# Patient Record
Sex: FEMALE | Race: WHITE | NOT HISPANIC OR LATINO | URBAN - METROPOLITAN AREA
[De-identification: names, ages, dates, MRNs, and addresses within clinical notes are randomized per-mention and may not be internally consistent; named-entity substitution may affect disease eponyms.]

---

## 2018-08-28 ENCOUNTER — OFFICE (OUTPATIENT)
Dept: URBAN - METROPOLITAN AREA PATHOLOGY 22 | Facility: PATHOLOGY | Age: 73
End: 2018-08-28
Payer: OTHER GOVERNMENT

## 2018-08-28 ENCOUNTER — AMBULATORY SURGICAL CENTER (OUTPATIENT)
Dept: URBAN - METROPOLITAN AREA SURGERY 2 | Facility: SURGERY | Age: 73
End: 2018-08-28
Payer: OTHER GOVERNMENT

## 2018-08-28 VITALS
SYSTOLIC BLOOD PRESSURE: 125 MMHG | DIASTOLIC BLOOD PRESSURE: 61 MMHG | WEIGHT: 170 LBS | DIASTOLIC BLOOD PRESSURE: 66 MMHG | DIASTOLIC BLOOD PRESSURE: 61 MMHG | HEART RATE: 75 BPM | HEART RATE: 77 BPM | OXYGEN SATURATION: 98 % | TEMPERATURE: 97.7 F | TEMPERATURE: 98.5 F | RESPIRATION RATE: 18 BRPM | HEART RATE: 81 BPM | SYSTOLIC BLOOD PRESSURE: 109 MMHG | WEIGHT: 170 LBS | RESPIRATION RATE: 18 BRPM | DIASTOLIC BLOOD PRESSURE: 75 MMHG | DIASTOLIC BLOOD PRESSURE: 75 MMHG | DIASTOLIC BLOOD PRESSURE: 66 MMHG | HEIGHT: 62 IN | HEART RATE: 75 BPM | OXYGEN SATURATION: 98 % | SYSTOLIC BLOOD PRESSURE: 109 MMHG | TEMPERATURE: 98.5 F | HEIGHT: 62 IN | SYSTOLIC BLOOD PRESSURE: 110 MMHG | SYSTOLIC BLOOD PRESSURE: 110 MMHG | HEART RATE: 77 BPM | OXYGEN SATURATION: 99 % | SYSTOLIC BLOOD PRESSURE: 101 MMHG | OXYGEN SATURATION: 97 % | OXYGEN SATURATION: 97 % | SYSTOLIC BLOOD PRESSURE: 125 MMHG | SYSTOLIC BLOOD PRESSURE: 101 MMHG | HEART RATE: 81 BPM | DIASTOLIC BLOOD PRESSURE: 77 MMHG | DIASTOLIC BLOOD PRESSURE: 77 MMHG | OXYGEN SATURATION: 99 % | TEMPERATURE: 97.7 F

## 2018-08-28 DIAGNOSIS — D12.3 BENIGN NEOPLASM OF TRANSVERSE COLON: ICD-10-CM

## 2018-08-28 DIAGNOSIS — K64.9 UNSPECIFIED HEMORRHOIDS: ICD-10-CM

## 2018-08-28 DIAGNOSIS — D17.79 BENIGN LIPOMATOUS NEOPLASM OF OTHER SITES: ICD-10-CM

## 2018-08-28 DIAGNOSIS — Z86.010 PERSONAL HISTORY OF COLONIC POLYPS: ICD-10-CM

## 2018-08-28 DIAGNOSIS — K57.30 DIVERTICULOSIS OF LARGE INTESTINE WITHOUT PERFORATION OR ABS: ICD-10-CM

## 2018-08-28 PROCEDURE — 45380 COLONOSCOPY AND BIOPSY: CPT | Mod: PT | Performed by: INTERNAL MEDICINE

## 2018-08-28 PROCEDURE — G8907 PT DOC NO EVENTS ON DISCHARG: HCPCS | Performed by: INTERNAL MEDICINE

## 2018-08-28 PROCEDURE — 88305 TISSUE EXAM BY PATHOLOGIST: CPT | Performed by: INTERNAL MEDICINE

## 2018-08-28 PROCEDURE — G8918 PT W/O PREOP ORDER IV AB PRO: HCPCS | Performed by: INTERNAL MEDICINE

## 2018-11-05 ENCOUNTER — OFFICE (OUTPATIENT)
Dept: URBAN - METROPOLITAN AREA CLINIC 19 | Facility: CLINIC | Age: 73
End: 2018-11-05
Payer: OTHER GOVERNMENT

## 2018-11-05 VITALS
SYSTOLIC BLOOD PRESSURE: 118 MMHG | WEIGHT: 168 LBS | DIASTOLIC BLOOD PRESSURE: 76 MMHG | HEIGHT: 62 IN | HEART RATE: 109 BPM

## 2018-11-05 DIAGNOSIS — M13.859 OTHER SPECIFIED ARTHRITIS, UNSPECIFIED HIP: ICD-10-CM

## 2018-11-05 DIAGNOSIS — K44.9 DIAPHRAGMATIC HERNIA WITHOUT OBSTRUCTION OR GANGRENE: ICD-10-CM

## 2018-11-05 DIAGNOSIS — R49.0 DYSPHONIA: ICD-10-CM

## 2018-11-05 DIAGNOSIS — K21.9 GASTRO-ESOPHAGEAL REFLUX DISEASE WITHOUT ESOPHAGITIS: ICD-10-CM

## 2018-11-05 DIAGNOSIS — K22.70 BARRETT'S ESOPHAGUS WITHOUT DYSPLASIA: ICD-10-CM

## 2018-11-05 PROCEDURE — 99214 OFFICE O/P EST MOD 30 MIN: CPT | Performed by: INTERNAL MEDICINE

## 2018-11-05 NOTE — SERVICEHPINOTES
Mrs. Jasmine is a 73 year old female originally seen for surveillance of colonic neoplasia. The patient denies any fevers, weight loss, abdominal pain, nausea, vomiting, constipation, diarrhea, or rectal bleeding. Last colonoscopy was in 2013 was okay. 2004 with 10mm adenoma removed.She is still hoarse. Saw ENT and had Laryngoscopy. Was started on reglan 10mg at bedtime. Has not provided much relief.  Also on esomeprazole BID. She has a known large hiatal hernia. She is due for barretts surveillance

## 2019-02-05 ENCOUNTER — OFFICE (OUTPATIENT)
Dept: URBAN - METROPOLITAN AREA CLINIC 19 | Facility: CLINIC | Age: 74
End: 2019-02-05
Payer: OTHER GOVERNMENT

## 2019-02-05 VITALS
DIASTOLIC BLOOD PRESSURE: 76 MMHG | HEART RATE: 101 BPM | SYSTOLIC BLOOD PRESSURE: 113 MMHG | HEIGHT: 62 IN | WEIGHT: 162 LBS

## 2019-02-05 DIAGNOSIS — R49.0 DYSPHONIA: ICD-10-CM

## 2019-02-05 DIAGNOSIS — M13.859 OTHER SPECIFIED ARTHRITIS, UNSPECIFIED HIP: ICD-10-CM

## 2019-02-05 DIAGNOSIS — K22.70 BARRETT'S ESOPHAGUS WITHOUT DYSPLASIA: ICD-10-CM

## 2019-02-05 DIAGNOSIS — K21.9 GASTRO-ESOPHAGEAL REFLUX DISEASE WITHOUT ESOPHAGITIS: ICD-10-CM

## 2019-02-05 PROCEDURE — 99213 OFFICE O/P EST LOW 20 MIN: CPT | Performed by: INTERNAL MEDICINE

## 2019-02-05 RX ORDER — DEXLANSOPRAZOLE 60 MG/1
60 CAPSULE, DELAYED RELEASE ORAL
Qty: 30 | Refills: 3 | Status: COMPLETED
Start: 2019-02-05 | End: 2019-05-08

## 2019-05-08 ENCOUNTER — OFFICE (OUTPATIENT)
Dept: URBAN - METROPOLITAN AREA PATHOLOGY 22 | Facility: PATHOLOGY | Age: 74
End: 2019-05-08
Payer: OTHER GOVERNMENT

## 2019-05-08 ENCOUNTER — AMBULATORY SURGICAL CENTER (OUTPATIENT)
Dept: URBAN - METROPOLITAN AREA SURGERY 2 | Facility: SURGERY | Age: 74
End: 2019-05-08
Payer: OTHER GOVERNMENT

## 2019-05-08 VITALS
WEIGHT: 170 LBS | DIASTOLIC BLOOD PRESSURE: 45 MMHG | RESPIRATION RATE: 16 BRPM | HEART RATE: 71 BPM | HEART RATE: 72 BPM | OXYGEN SATURATION: 97 % | HEART RATE: 81 BPM | TEMPERATURE: 97.6 F | OXYGEN SATURATION: 98 % | RESPIRATION RATE: 16 BRPM | SYSTOLIC BLOOD PRESSURE: 89 MMHG | RESPIRATION RATE: 17 BRPM | DIASTOLIC BLOOD PRESSURE: 59 MMHG | TEMPERATURE: 97.6 F | OXYGEN SATURATION: 97 % | TEMPERATURE: 98.1 F | HEART RATE: 72 BPM | OXYGEN SATURATION: 98 % | DIASTOLIC BLOOD PRESSURE: 52 MMHG | HEIGHT: 62 IN | WEIGHT: 170 LBS | SYSTOLIC BLOOD PRESSURE: 80 MMHG | OXYGEN SATURATION: 96 % | HEART RATE: 70 BPM | SYSTOLIC BLOOD PRESSURE: 85 MMHG | SYSTOLIC BLOOD PRESSURE: 80 MMHG | SYSTOLIC BLOOD PRESSURE: 101 MMHG | OXYGEN SATURATION: 94 % | RESPIRATION RATE: 18 BRPM | HEART RATE: 82 BPM | HEART RATE: 82 BPM | OXYGEN SATURATION: 96 % | DIASTOLIC BLOOD PRESSURE: 63 MMHG | RESPIRATION RATE: 18 BRPM | HEART RATE: 81 BPM | OXYGEN SATURATION: 94 % | OXYGEN SATURATION: 92 % | OXYGEN SATURATION: 92 % | DIASTOLIC BLOOD PRESSURE: 59 MMHG | SYSTOLIC BLOOD PRESSURE: 94 MMHG | TEMPERATURE: 98.1 F | DIASTOLIC BLOOD PRESSURE: 45 MMHG | HEART RATE: 67 BPM | SYSTOLIC BLOOD PRESSURE: 101 MMHG | HEART RATE: 71 BPM | HEIGHT: 62 IN | HEART RATE: 70 BPM | SYSTOLIC BLOOD PRESSURE: 94 MMHG | DIASTOLIC BLOOD PRESSURE: 63 MMHG | RESPIRATION RATE: 17 BRPM | DIASTOLIC BLOOD PRESSURE: 52 MMHG | SYSTOLIC BLOOD PRESSURE: 89 MMHG | SYSTOLIC BLOOD PRESSURE: 85 MMHG | HEART RATE: 67 BPM

## 2019-05-08 DIAGNOSIS — K22.70 BARRETT'S ESOPHAGUS WITHOUT DYSPLASIA: ICD-10-CM

## 2019-05-08 DIAGNOSIS — K31.7 POLYP OF STOMACH AND DUODENUM: ICD-10-CM

## 2019-05-08 DIAGNOSIS — K44.9 DIAPHRAGMATIC HERNIA WITHOUT OBSTRUCTION OR GANGRENE: ICD-10-CM

## 2019-05-08 DIAGNOSIS — K20.9 ESOPHAGITIS, UNSPECIFIED: ICD-10-CM

## 2019-05-08 PROCEDURE — 43239 EGD BIOPSY SINGLE/MULTIPLE: CPT | Performed by: INTERNAL MEDICINE

## 2019-05-08 PROCEDURE — G8907 PT DOC NO EVENTS ON DISCHARG: HCPCS | Performed by: INTERNAL MEDICINE

## 2019-05-08 PROCEDURE — 88305 TISSUE EXAM BY PATHOLOGIST: CPT | Performed by: INTERNAL MEDICINE

## 2019-05-08 PROCEDURE — 3126F ESOPH BX RPRT W/DYSPL INFO: CPT | Performed by: INTERNAL MEDICINE

## 2019-05-08 PROCEDURE — G8918 PT W/O PREOP ORDER IV AB PRO: HCPCS | Performed by: INTERNAL MEDICINE

## 2020-03-02 ENCOUNTER — OFFICE (OUTPATIENT)
Dept: URBAN - METROPOLITAN AREA CLINIC 19 | Facility: CLINIC | Age: 75
End: 2020-03-02
Payer: OTHER GOVERNMENT

## 2020-03-02 VITALS
SYSTOLIC BLOOD PRESSURE: 87 MMHG | WEIGHT: 161 LBS | HEIGHT: 62 IN | HEART RATE: 85 BPM | DIASTOLIC BLOOD PRESSURE: 54 MMHG

## 2020-03-02 DIAGNOSIS — R07.2 PRECORDIAL PAIN: ICD-10-CM

## 2020-03-02 DIAGNOSIS — K22.719 BARRETT'S ESOPHAGUS WITH DYSPLASIA, UNSPECIFIED: ICD-10-CM

## 2020-03-02 DIAGNOSIS — R07.89 OTHER CHEST PAIN: ICD-10-CM

## 2020-03-02 PROCEDURE — 99214 OFFICE O/P EST MOD 30 MIN: CPT | Performed by: INTERNAL MEDICINE

## 2020-03-02 RX ORDER — METHYLPREDNISOLONE 4 MG/1
TABLET ORAL
Qty: 1 | Refills: 0 | Status: COMPLETED
Start: 2020-03-02 | End: 2020-03-25

## 2020-03-18 ENCOUNTER — OFFICE (OUTPATIENT)
Dept: URBAN - METROPOLITAN AREA CLINIC 19 | Facility: CLINIC | Age: 75
End: 2020-03-18
Payer: OTHER GOVERNMENT

## 2020-03-18 DIAGNOSIS — K82.4 CHOLESTEROLOSIS OF GALLBLADDER: ICD-10-CM

## 2020-03-18 DIAGNOSIS — R07.9 CHEST PAIN, UNSPECIFIED: ICD-10-CM

## 2020-03-18 DIAGNOSIS — K76.0 FATTY (CHANGE OF) LIVER, NOT ELSEWHERE CLASSIFIED: ICD-10-CM

## 2020-03-18 PROCEDURE — 76705 ECHO EXAM OF ABDOMEN: CPT | Mod: TC | Performed by: INTERNAL MEDICINE

## 2020-03-25 ENCOUNTER — AMBULATORY SURGICAL CENTER (OUTPATIENT)
Dept: URBAN - METROPOLITAN AREA SURGERY 2 | Facility: SURGERY | Age: 75
End: 2020-03-25
Payer: OTHER GOVERNMENT

## 2020-03-25 ENCOUNTER — OFFICE (OUTPATIENT)
Dept: URBAN - METROPOLITAN AREA PATHOLOGY 22 | Facility: PATHOLOGY | Age: 75
End: 2020-03-25
Payer: OTHER GOVERNMENT

## 2020-03-25 VITALS
WEIGHT: 170 LBS | DIASTOLIC BLOOD PRESSURE: 84 MMHG | WEIGHT: 170 LBS | HEART RATE: 70 BPM | HEART RATE: 69 BPM | OXYGEN SATURATION: 95 % | HEART RATE: 78 BPM | DIASTOLIC BLOOD PRESSURE: 69 MMHG | DIASTOLIC BLOOD PRESSURE: 57 MMHG | OXYGEN SATURATION: 97 % | SYSTOLIC BLOOD PRESSURE: 88 MMHG | SYSTOLIC BLOOD PRESSURE: 110 MMHG | HEART RATE: 82 BPM | SYSTOLIC BLOOD PRESSURE: 110 MMHG | SYSTOLIC BLOOD PRESSURE: 99 MMHG | DIASTOLIC BLOOD PRESSURE: 68 MMHG | SYSTOLIC BLOOD PRESSURE: 110 MMHG | OXYGEN SATURATION: 95 % | OXYGEN SATURATION: 97 % | DIASTOLIC BLOOD PRESSURE: 57 MMHG | OXYGEN SATURATION: 95 % | HEART RATE: 78 BPM | SYSTOLIC BLOOD PRESSURE: 88 MMHG | DIASTOLIC BLOOD PRESSURE: 51 MMHG | DIASTOLIC BLOOD PRESSURE: 68 MMHG | HEART RATE: 69 BPM | HEART RATE: 69 BPM | SYSTOLIC BLOOD PRESSURE: 88 MMHG | HEART RATE: 82 BPM | DIASTOLIC BLOOD PRESSURE: 68 MMHG | TEMPERATURE: 97.9 F | HEART RATE: 78 BPM | OXYGEN SATURATION: 97 % | OXYGEN SATURATION: 96 % | DIASTOLIC BLOOD PRESSURE: 57 MMHG | DIASTOLIC BLOOD PRESSURE: 51 MMHG | HEIGHT: 61 IN | HEIGHT: 61 IN | DIASTOLIC BLOOD PRESSURE: 84 MMHG | TEMPERATURE: 97.9 F | TEMPERATURE: 97.9 F | WEIGHT: 170 LBS | HEART RATE: 70 BPM | DIASTOLIC BLOOD PRESSURE: 51 MMHG | DIASTOLIC BLOOD PRESSURE: 69 MMHG | DIASTOLIC BLOOD PRESSURE: 69 MMHG | SYSTOLIC BLOOD PRESSURE: 97 MMHG | OXYGEN SATURATION: 96 % | HEART RATE: 70 BPM | HEIGHT: 61 IN | DIASTOLIC BLOOD PRESSURE: 84 MMHG | SYSTOLIC BLOOD PRESSURE: 99 MMHG | SYSTOLIC BLOOD PRESSURE: 97 MMHG | RESPIRATION RATE: 16 BRPM | OXYGEN SATURATION: 96 % | SYSTOLIC BLOOD PRESSURE: 97 MMHG | RESPIRATION RATE: 16 BRPM | RESPIRATION RATE: 16 BRPM | SYSTOLIC BLOOD PRESSURE: 99 MMHG | HEART RATE: 82 BPM

## 2020-03-25 DIAGNOSIS — K22.70 BARRETT'S ESOPHAGUS WITHOUT DYSPLASIA: ICD-10-CM

## 2020-03-25 DIAGNOSIS — R13.10 DYSPHAGIA, UNSPECIFIED: ICD-10-CM

## 2020-03-25 DIAGNOSIS — K21.0 GASTRO-ESOPHAGEAL REFLUX DISEASE WITH ESOPHAGITIS: ICD-10-CM

## 2020-03-25 PROBLEM — K20.8 OTHER ESOPHAGITIS: Status: ACTIVE | Noted: 2020-03-25

## 2020-03-25 PROCEDURE — G8918 PT W/O PREOP ORDER IV AB PRO: HCPCS | Performed by: INTERNAL MEDICINE

## 2020-03-25 PROCEDURE — 88305 TISSUE EXAM BY PATHOLOGIST: CPT | Performed by: INTERNAL MEDICINE

## 2020-03-25 PROCEDURE — 88313 SPECIAL STAINS GROUP 2: CPT | Performed by: INTERNAL MEDICINE

## 2020-03-25 PROCEDURE — 43239 EGD BIOPSY SINGLE/MULTIPLE: CPT | Performed by: INTERNAL MEDICINE

## 2020-03-25 PROCEDURE — G8907 PT DOC NO EVENTS ON DISCHARG: HCPCS | Performed by: INTERNAL MEDICINE

## 2020-03-25 PROCEDURE — 3126F ESOPH BX RPRT W/DYSPL INFO: CPT | Performed by: INTERNAL MEDICINE

## 2020-03-25 RX ORDER — SUCRALFATE 1 G/1
TABLET ORAL
Qty: 90 | Refills: 1 | Status: ACTIVE

## 2020-12-09 ENCOUNTER — AMBULATORY SURGICAL CENTER (OUTPATIENT)
Dept: URBAN - METROPOLITAN AREA SURGERY 2 | Facility: SURGERY | Age: 75
End: 2020-12-09
Payer: OTHER GOVERNMENT

## 2020-12-09 ENCOUNTER — OFFICE (OUTPATIENT)
Dept: URBAN - METROPOLITAN AREA PATHOLOGY 22 | Facility: PATHOLOGY | Age: 75
End: 2020-12-09
Payer: OTHER GOVERNMENT

## 2020-12-09 VITALS
DIASTOLIC BLOOD PRESSURE: 62 MMHG | OXYGEN SATURATION: 98 % | OXYGEN SATURATION: 97 % | OXYGEN SATURATION: 95 % | DIASTOLIC BLOOD PRESSURE: 82 MMHG | HEART RATE: 84 BPM | SYSTOLIC BLOOD PRESSURE: 118 MMHG | HEART RATE: 71 BPM | SYSTOLIC BLOOD PRESSURE: 112 MMHG | TEMPERATURE: 98 F | HEART RATE: 73 BPM | OXYGEN SATURATION: 95 % | DIASTOLIC BLOOD PRESSURE: 65 MMHG | HEART RATE: 84 BPM | DIASTOLIC BLOOD PRESSURE: 65 MMHG | DIASTOLIC BLOOD PRESSURE: 67 MMHG | HEART RATE: 71 BPM | HEIGHT: 61 IN | DIASTOLIC BLOOD PRESSURE: 59 MMHG | DIASTOLIC BLOOD PRESSURE: 62 MMHG | HEIGHT: 61 IN | HEART RATE: 72 BPM | OXYGEN SATURATION: 98 % | RESPIRATION RATE: 16 BRPM | DIASTOLIC BLOOD PRESSURE: 59 MMHG | TEMPERATURE: 97.3 F | OXYGEN SATURATION: 96 % | DIASTOLIC BLOOD PRESSURE: 62 MMHG | RESPIRATION RATE: 16 BRPM | OXYGEN SATURATION: 95 % | DIASTOLIC BLOOD PRESSURE: 67 MMHG | WEIGHT: 169 LBS | WEIGHT: 169 LBS | HEART RATE: 72 BPM | SYSTOLIC BLOOD PRESSURE: 126 MMHG | SYSTOLIC BLOOD PRESSURE: 118 MMHG | TEMPERATURE: 97.3 F | HEART RATE: 73 BPM | OXYGEN SATURATION: 93 % | DIASTOLIC BLOOD PRESSURE: 82 MMHG | WEIGHT: 169 LBS | TEMPERATURE: 97.3 F | DIASTOLIC BLOOD PRESSURE: 65 MMHG | SYSTOLIC BLOOD PRESSURE: 112 MMHG | DIASTOLIC BLOOD PRESSURE: 59 MMHG | HEART RATE: 71 BPM | OXYGEN SATURATION: 97 % | SYSTOLIC BLOOD PRESSURE: 108 MMHG | OXYGEN SATURATION: 93 % | OXYGEN SATURATION: 96 % | OXYGEN SATURATION: 96 % | SYSTOLIC BLOOD PRESSURE: 126 MMHG | HEART RATE: 84 BPM | HEART RATE: 72 BPM | DIASTOLIC BLOOD PRESSURE: 67 MMHG | TEMPERATURE: 98 F | DIASTOLIC BLOOD PRESSURE: 82 MMHG | SYSTOLIC BLOOD PRESSURE: 108 MMHG | OXYGEN SATURATION: 98 % | SYSTOLIC BLOOD PRESSURE: 108 MMHG | SYSTOLIC BLOOD PRESSURE: 126 MMHG | TEMPERATURE: 98 F | OXYGEN SATURATION: 93 % | SYSTOLIC BLOOD PRESSURE: 112 MMHG | HEART RATE: 73 BPM | SYSTOLIC BLOOD PRESSURE: 118 MMHG | HEIGHT: 61 IN | OXYGEN SATURATION: 97 % | RESPIRATION RATE: 16 BRPM

## 2020-12-09 DIAGNOSIS — K21.00 GASTRO-ESOPHAGEAL REFLUX DISEASE WITH ESOPHAGITIS, WITHOUT B: ICD-10-CM

## 2020-12-09 DIAGNOSIS — K44.9 DIAPHRAGMATIC HERNIA WITHOUT OBSTRUCTION OR GANGRENE: ICD-10-CM

## 2020-12-09 DIAGNOSIS — K22.710 BARRETT'S ESOPHAGUS WITH LOW GRADE DYSPLASIA: ICD-10-CM

## 2020-12-09 DIAGNOSIS — K22.711 BARRETT'S ESOPHAGUS WITH HIGH GRADE DYSPLASIA: ICD-10-CM

## 2020-12-09 PROBLEM — K22.10 ULCER OF ESOPHAGUS WITHOUT BLEEDING: Status: ACTIVE | Noted: 2020-12-09

## 2020-12-09 PROCEDURE — 88305 TISSUE EXAM BY PATHOLOGIST: CPT | Performed by: INTERNAL MEDICINE

## 2020-12-09 PROCEDURE — G8907 PT DOC NO EVENTS ON DISCHARG: HCPCS | Performed by: INTERNAL MEDICINE

## 2020-12-09 PROCEDURE — 88341 IMHCHEM/IMCYTCHM EA ADD ANTB: CPT | Performed by: INTERNAL MEDICINE

## 2020-12-09 PROCEDURE — 88342 IMHCHEM/IMCYTCHM 1ST ANTB: CPT | Performed by: INTERNAL MEDICINE

## 2020-12-09 PROCEDURE — 43239 EGD BIOPSY SINGLE/MULTIPLE: CPT | Performed by: INTERNAL MEDICINE

## 2020-12-09 PROCEDURE — G8918 PT W/O PREOP ORDER IV AB PRO: HCPCS | Performed by: INTERNAL MEDICINE

## 2020-12-09 RX ORDER — FAMOTIDINE 20 MG/1
20 TABLET, FILM COATED ORAL
Qty: 90 | Refills: 3 | Status: ACTIVE
Start: 2020-03-30

## 2020-12-09 RX ORDER — RABEPRAZOLE SODIUM 20 MG/1
40 TABLET, DELAYED RELEASE ORAL
Qty: 180 | Refills: 3 | Status: ACTIVE
Start: 2020-03-30

## 2021-06-09 ENCOUNTER — OFFICE (OUTPATIENT)
Dept: URBAN - METROPOLITAN AREA CLINIC 19 | Facility: CLINIC | Age: 76
End: 2021-06-09
Payer: OTHER GOVERNMENT

## 2021-06-09 VITALS
HEIGHT: 61 IN | OXYGEN SATURATION: 94 % | WEIGHT: 171 LBS | HEART RATE: 90 BPM | DIASTOLIC BLOOD PRESSURE: 86 MMHG | SYSTOLIC BLOOD PRESSURE: 130 MMHG

## 2021-06-09 DIAGNOSIS — I49.3 VENTRICULAR PREMATURE DEPOLARIZATION: ICD-10-CM

## 2021-06-09 DIAGNOSIS — K22.719 BARRETT'S ESOPHAGUS WITH DYSPLASIA, UNSPECIFIED: ICD-10-CM

## 2021-06-09 PROCEDURE — 99214 OFFICE O/P EST MOD 30 MIN: CPT | Performed by: INTERNAL MEDICINE

## 2021-06-09 RX ORDER — SUCRALFATE 1 G/1
TABLET ORAL
Qty: 90 | Refills: 1 | Status: ACTIVE

## 2021-06-09 RX ORDER — FAMOTIDINE 20 MG/1
20 TABLET, FILM COATED ORAL
Qty: 90 | Refills: 3 | Status: ACTIVE
Start: 2020-03-30

## 2021-06-09 RX ORDER — RABEPRAZOLE SODIUM 20 MG/1
40 TABLET, DELAYED RELEASE ORAL
Qty: 180 | Refills: 3 | Status: ACTIVE
Start: 2020-03-30

## 2021-06-09 NOTE — SERVICEHPINOTES
Mrs. Jasmine is here for barretts and hiatal hernia surveillance. -EGD 5/2019 indefinite for dysplasia. Plan was for repeat in 6 months. BR-UGI in the interim with GERD, small peraesophageal HH, and incomplete peristalsisBR-EGD 3/2020 indefinite for dysplasia vs. reactive atypiaBR-EGD 12/2020 with a focus of LGD and HGD, was referred for ablation but unfortunately cancelled such and did not reschedule. This was a 6 month f/u that was arranged after the 12/2020 EGD. -Switched to rabeprazole BID and famotidine qhs + sucralfate prn. This regimen is working well.BR

## 2021-09-23 ENCOUNTER — ON CAMPUS - OUTPATIENT (OUTPATIENT)
Dept: URBAN - METROPOLITAN AREA HOSPITAL 131 | Facility: HOSPITAL | Age: 76
End: 2021-09-23
Payer: OTHER GOVERNMENT

## 2021-09-23 DIAGNOSIS — K22.711 BARRETT'S ESOPHAGUS WITH HIGH GRADE DYSPLASIA: ICD-10-CM

## 2021-09-23 PROCEDURE — 43229 ESOPHAGOSCOPY LESION ABLATE: CPT | Performed by: INTERNAL MEDICINE

## 2022-01-13 ENCOUNTER — ON CAMPUS - OUTPATIENT (OUTPATIENT)
Dept: URBAN - METROPOLITAN AREA HOSPITAL 131 | Facility: HOSPITAL | Age: 77
End: 2022-01-13
Payer: OTHER GOVERNMENT

## 2022-01-13 DIAGNOSIS — K22.711 BARRETT'S ESOPHAGUS WITH HIGH GRADE DYSPLASIA: ICD-10-CM

## 2022-01-13 PROCEDURE — 43229 ESOPHAGOSCOPY LESION ABLATE: CPT | Performed by: INTERNAL MEDICINE

## 2022-04-12 ENCOUNTER — ON CAMPUS - OUTPATIENT (OUTPATIENT)
Dept: URBAN - METROPOLITAN AREA HOSPITAL 131 | Facility: HOSPITAL | Age: 77
End: 2022-04-12
Payer: OTHER GOVERNMENT

## 2022-04-12 DIAGNOSIS — K22.711 BARRETT'S ESOPHAGUS WITH HIGH GRADE DYSPLASIA: ICD-10-CM

## 2022-04-12 PROCEDURE — 43229 ESOPHAGOSCOPY LESION ABLATE: CPT | Performed by: INTERNAL MEDICINE

## 2022-07-11 ENCOUNTER — AMBULATORY SURGICAL CENTER (OUTPATIENT)
Dept: URBAN - METROPOLITAN AREA SURGERY 2 | Facility: SURGERY | Age: 77
End: 2022-07-11
Payer: OTHER GOVERNMENT

## 2022-07-11 ENCOUNTER — OFFICE (OUTPATIENT)
Dept: URBAN - METROPOLITAN AREA PATHOLOGY 20 | Facility: PATHOLOGY | Age: 77
End: 2022-07-11
Payer: OTHER GOVERNMENT

## 2022-07-11 VITALS
HEIGHT: 61 IN | OXYGEN SATURATION: 91 % | RESPIRATION RATE: 18 BRPM | HEIGHT: 61 IN | DIASTOLIC BLOOD PRESSURE: 63 MMHG | TEMPERATURE: 96.8 F | SYSTOLIC BLOOD PRESSURE: 93 MMHG | DIASTOLIC BLOOD PRESSURE: 56 MMHG | OXYGEN SATURATION: 99 % | TEMPERATURE: 98.2 F | HEART RATE: 89 BPM | HEART RATE: 78 BPM | OXYGEN SATURATION: 99 % | TEMPERATURE: 96.8 F | HEART RATE: 75 BPM | OXYGEN SATURATION: 92 % | DIASTOLIC BLOOD PRESSURE: 90 MMHG | HEART RATE: 87 BPM | HEART RATE: 87 BPM | SYSTOLIC BLOOD PRESSURE: 103 MMHG | OXYGEN SATURATION: 93 % | OXYGEN SATURATION: 92 % | HEART RATE: 87 BPM | TEMPERATURE: 98.2 F | OXYGEN SATURATION: 91 % | SYSTOLIC BLOOD PRESSURE: 103 MMHG | DIASTOLIC BLOOD PRESSURE: 90 MMHG | DIASTOLIC BLOOD PRESSURE: 65 MMHG | OXYGEN SATURATION: 92 % | WEIGHT: 168 LBS | TEMPERATURE: 96.8 F | RESPIRATION RATE: 18 BRPM | WEIGHT: 168 LBS | HEART RATE: 78 BPM | DIASTOLIC BLOOD PRESSURE: 56 MMHG | SYSTOLIC BLOOD PRESSURE: 105 MMHG | OXYGEN SATURATION: 99 % | SYSTOLIC BLOOD PRESSURE: 105 MMHG | DIASTOLIC BLOOD PRESSURE: 65 MMHG | WEIGHT: 168 LBS | SYSTOLIC BLOOD PRESSURE: 107 MMHG | DIASTOLIC BLOOD PRESSURE: 63 MMHG | DIASTOLIC BLOOD PRESSURE: 56 MMHG | DIASTOLIC BLOOD PRESSURE: 63 MMHG | SYSTOLIC BLOOD PRESSURE: 105 MMHG | SYSTOLIC BLOOD PRESSURE: 103 MMHG | SYSTOLIC BLOOD PRESSURE: 122 MMHG | RESPIRATION RATE: 18 BRPM | HEART RATE: 89 BPM | SYSTOLIC BLOOD PRESSURE: 122 MMHG | TEMPERATURE: 98.2 F | OXYGEN SATURATION: 94 % | SYSTOLIC BLOOD PRESSURE: 107 MMHG | DIASTOLIC BLOOD PRESSURE: 90 MMHG | OXYGEN SATURATION: 93 % | HEIGHT: 61 IN | HEART RATE: 89 BPM | SYSTOLIC BLOOD PRESSURE: 107 MMHG | SYSTOLIC BLOOD PRESSURE: 93 MMHG | OXYGEN SATURATION: 93 % | HEART RATE: 78 BPM | HEART RATE: 75 BPM | OXYGEN SATURATION: 91 % | OXYGEN SATURATION: 94 % | DIASTOLIC BLOOD PRESSURE: 65 MMHG | SYSTOLIC BLOOD PRESSURE: 122 MMHG | HEART RATE: 75 BPM | OXYGEN SATURATION: 94 % | SYSTOLIC BLOOD PRESSURE: 93 MMHG

## 2022-07-11 DIAGNOSIS — K44.9 DIAPHRAGMATIC HERNIA WITHOUT OBSTRUCTION OR GANGRENE: ICD-10-CM

## 2022-07-11 DIAGNOSIS — K31.7 POLYP OF STOMACH AND DUODENUM: ICD-10-CM

## 2022-07-11 PROBLEM — K92.2 GASTROINTESTINAL HEMORRHAGE, UNSPECIFIED: Status: ACTIVE | Noted: 2022-07-11

## 2022-07-11 PROCEDURE — G8918 PT W/O PREOP ORDER IV AB PRO: HCPCS | Performed by: INTERNAL MEDICINE

## 2022-07-11 PROCEDURE — 43239 EGD BIOPSY SINGLE/MULTIPLE: CPT | Performed by: INTERNAL MEDICINE

## 2022-12-16 ENCOUNTER — AMBULATORY SURGICAL CENTER (OUTPATIENT)
Dept: URBAN - METROPOLITAN AREA SURGERY 3 | Facility: SURGERY | Age: 77
End: 2022-12-16
Payer: OTHER GOVERNMENT

## 2022-12-16 ENCOUNTER — OFFICE (OUTPATIENT)
Dept: URBAN - METROPOLITAN AREA PATHOLOGY 22 | Facility: PATHOLOGY | Age: 77
End: 2022-12-16
Payer: OTHER GOVERNMENT

## 2022-12-16 VITALS
SYSTOLIC BLOOD PRESSURE: 140 MMHG | RESPIRATION RATE: 17 BRPM | SYSTOLIC BLOOD PRESSURE: 138 MMHG | RESPIRATION RATE: 20 BRPM | HEIGHT: 61 IN | HEART RATE: 80 BPM | SYSTOLIC BLOOD PRESSURE: 129 MMHG | HEART RATE: 82 BPM | SYSTOLIC BLOOD PRESSURE: 136 MMHG | OXYGEN SATURATION: 92 % | DIASTOLIC BLOOD PRESSURE: 96 MMHG | OXYGEN SATURATION: 96 % | SYSTOLIC BLOOD PRESSURE: 142 MMHG | OXYGEN SATURATION: 96 % | HEART RATE: 78 BPM | HEART RATE: 87 BPM | OXYGEN SATURATION: 92 % | DIASTOLIC BLOOD PRESSURE: 99 MMHG | HEART RATE: 78 BPM | DIASTOLIC BLOOD PRESSURE: 98 MMHG | DIASTOLIC BLOOD PRESSURE: 51 MMHG | WEIGHT: 170 LBS | OXYGEN SATURATION: 95 % | HEART RATE: 82 BPM | RESPIRATION RATE: 17 BRPM | HEART RATE: 83 BPM | TEMPERATURE: 97.4 F | RESPIRATION RATE: 20 BRPM | SYSTOLIC BLOOD PRESSURE: 138 MMHG | RESPIRATION RATE: 18 BRPM | SYSTOLIC BLOOD PRESSURE: 138 MMHG | DIASTOLIC BLOOD PRESSURE: 85 MMHG | HEART RATE: 83 BPM | SYSTOLIC BLOOD PRESSURE: 129 MMHG | RESPIRATION RATE: 20 BRPM | DIASTOLIC BLOOD PRESSURE: 85 MMHG | DIASTOLIC BLOOD PRESSURE: 98 MMHG | OXYGEN SATURATION: 96 % | HEIGHT: 61 IN | TEMPERATURE: 97.4 F | TEMPERATURE: 97.6 F | SYSTOLIC BLOOD PRESSURE: 140 MMHG | DIASTOLIC BLOOD PRESSURE: 99 MMHG | HEART RATE: 78 BPM | DIASTOLIC BLOOD PRESSURE: 99 MMHG | HEART RATE: 80 BPM | OXYGEN SATURATION: 95 % | HEIGHT: 61 IN | OXYGEN SATURATION: 92 % | TEMPERATURE: 97.4 F | RESPIRATION RATE: 17 BRPM | RESPIRATION RATE: 18 BRPM | HEART RATE: 82 BPM | SYSTOLIC BLOOD PRESSURE: 135 MMHG | SYSTOLIC BLOOD PRESSURE: 136 MMHG | SYSTOLIC BLOOD PRESSURE: 142 MMHG | WEIGHT: 170 LBS | RESPIRATION RATE: 18 BRPM | SYSTOLIC BLOOD PRESSURE: 140 MMHG | DIASTOLIC BLOOD PRESSURE: 51 MMHG | DIASTOLIC BLOOD PRESSURE: 96 MMHG | SYSTOLIC BLOOD PRESSURE: 136 MMHG | OXYGEN SATURATION: 95 % | DIASTOLIC BLOOD PRESSURE: 51 MMHG | DIASTOLIC BLOOD PRESSURE: 85 MMHG | SYSTOLIC BLOOD PRESSURE: 135 MMHG | TEMPERATURE: 97.6 F | DIASTOLIC BLOOD PRESSURE: 98 MMHG | WEIGHT: 170 LBS | SYSTOLIC BLOOD PRESSURE: 142 MMHG | HEART RATE: 87 BPM | DIASTOLIC BLOOD PRESSURE: 96 MMHG | HEART RATE: 83 BPM | TEMPERATURE: 97.6 F | HEART RATE: 87 BPM | HEART RATE: 80 BPM | SYSTOLIC BLOOD PRESSURE: 135 MMHG | SYSTOLIC BLOOD PRESSURE: 129 MMHG

## 2022-12-16 DIAGNOSIS — K31.7 POLYP OF STOMACH AND DUODENUM: ICD-10-CM

## 2022-12-16 DIAGNOSIS — K31.89 OTHER DISEASES OF STOMACH AND DUODENUM: ICD-10-CM

## 2022-12-16 DIAGNOSIS — K29.70 GASTRITIS, UNSPECIFIED, WITHOUT BLEEDING: ICD-10-CM

## 2022-12-16 DIAGNOSIS — K22.89 OTHER SPECIFIED DISEASE OF ESOPHAGUS: ICD-10-CM

## 2022-12-16 PROCEDURE — 43239 EGD BIOPSY SINGLE/MULTIPLE: CPT | Performed by: INTERNAL MEDICINE

## 2022-12-16 PROCEDURE — G8918 PT W/O PREOP ORDER IV AB PRO: HCPCS | Performed by: INTERNAL MEDICINE

## 2023-12-05 ENCOUNTER — OFFICE (OUTPATIENT)
Dept: URBAN - METROPOLITAN AREA CLINIC 19 | Facility: CLINIC | Age: 78
End: 2023-12-05
Payer: OTHER GOVERNMENT

## 2023-12-05 VITALS
OXYGEN SATURATION: 99 % | HEIGHT: 61 IN | DIASTOLIC BLOOD PRESSURE: 71 MMHG | SYSTOLIC BLOOD PRESSURE: 118 MMHG | WEIGHT: 174 LBS | HEART RATE: 84 BPM

## 2023-12-05 DIAGNOSIS — K21.9 GASTRO-ESOPHAGEAL REFLUX DISEASE WITHOUT ESOPHAGITIS: ICD-10-CM

## 2023-12-05 DIAGNOSIS — Z86.010 PERSONAL HISTORY OF COLONIC POLYPS: ICD-10-CM

## 2023-12-05 DIAGNOSIS — K22.719 BARRETT'S ESOPHAGUS WITH DYSPLASIA, UNSPECIFIED: ICD-10-CM

## 2023-12-05 PROCEDURE — 99213 OFFICE O/P EST LOW 20 MIN: CPT | Performed by: INTERNAL MEDICINE

## 2023-12-05 RX ORDER — RABEPRAZOLE SODIUM 20 MG/1
TABLET, DELAYED RELEASE ORAL
Qty: 180 | Refills: 1 | Status: ACTIVE

## 2023-12-05 RX ORDER — SODIUM PICOSULFATE, MAGNESIUM OXIDE, AND ANHYDROUS CITRIC ACID 10; 3.5; 12 MG/160ML; G/160ML; G/160ML
LIQUID ORAL
Qty: 350 | Refills: 0 | Status: ACTIVE
Start: 2023-12-05

## 2024-01-11 ENCOUNTER — AMBULATORY SURGICAL CENTER (OUTPATIENT)
Dept: URBAN - METROPOLITAN AREA SURGERY 2 | Facility: SURGERY | Age: 79
End: 2024-01-11
Payer: OTHER GOVERNMENT

## 2024-01-11 ENCOUNTER — OFFICE (OUTPATIENT)
Dept: URBAN - METROPOLITAN AREA PATHOLOGY 12 | Facility: PATHOLOGY | Age: 79
End: 2024-01-11
Payer: OTHER GOVERNMENT

## 2024-01-11 VITALS
DIASTOLIC BLOOD PRESSURE: 82 MMHG | OXYGEN SATURATION: 93 % | HEART RATE: 89 BPM | DIASTOLIC BLOOD PRESSURE: 88 MMHG | HEIGHT: 61 IN | RESPIRATION RATE: 18 BRPM | DIASTOLIC BLOOD PRESSURE: 79 MMHG | SYSTOLIC BLOOD PRESSURE: 121 MMHG | SYSTOLIC BLOOD PRESSURE: 106 MMHG | HEART RATE: 89 BPM | TEMPERATURE: 98 F | RESPIRATION RATE: 18 BRPM | SYSTOLIC BLOOD PRESSURE: 121 MMHG | DIASTOLIC BLOOD PRESSURE: 88 MMHG | OXYGEN SATURATION: 93 % | TEMPERATURE: 99 F | HEART RATE: 91 BPM | SYSTOLIC BLOOD PRESSURE: 122 MMHG | SYSTOLIC BLOOD PRESSURE: 97 MMHG | HEART RATE: 89 BPM | SYSTOLIC BLOOD PRESSURE: 122 MMHG | SYSTOLIC BLOOD PRESSURE: 97 MMHG | TEMPERATURE: 98 F | SYSTOLIC BLOOD PRESSURE: 132 MMHG | SYSTOLIC BLOOD PRESSURE: 106 MMHG | HEART RATE: 91 BPM | TEMPERATURE: 99 F | DIASTOLIC BLOOD PRESSURE: 51 MMHG | OXYGEN SATURATION: 97 % | HEART RATE: 98 BPM | HEART RATE: 84 BPM | DIASTOLIC BLOOD PRESSURE: 82 MMHG | DIASTOLIC BLOOD PRESSURE: 51 MMHG | OXYGEN SATURATION: 91 % | TEMPERATURE: 99 F | DIASTOLIC BLOOD PRESSURE: 82 MMHG | SYSTOLIC BLOOD PRESSURE: 97 MMHG | DIASTOLIC BLOOD PRESSURE: 88 MMHG | HEART RATE: 98 BPM | WEIGHT: 175 LBS | TEMPERATURE: 98 F | SYSTOLIC BLOOD PRESSURE: 122 MMHG | HEIGHT: 61 IN | WEIGHT: 175 LBS | SYSTOLIC BLOOD PRESSURE: 121 MMHG | WEIGHT: 175 LBS | DIASTOLIC BLOOD PRESSURE: 79 MMHG | OXYGEN SATURATION: 97 % | OXYGEN SATURATION: 97 % | DIASTOLIC BLOOD PRESSURE: 51 MMHG | HEART RATE: 84 BPM | OXYGEN SATURATION: 91 % | HEART RATE: 84 BPM | SYSTOLIC BLOOD PRESSURE: 106 MMHG | OXYGEN SATURATION: 94 % | RESPIRATION RATE: 18 BRPM | RESPIRATION RATE: 16 BRPM | RESPIRATION RATE: 16 BRPM | DIASTOLIC BLOOD PRESSURE: 681 MMHG | HEART RATE: 98 BPM | OXYGEN SATURATION: 94 % | RESPIRATION RATE: 16 BRPM | HEART RATE: 91 BPM | SYSTOLIC BLOOD PRESSURE: 132 MMHG | DIASTOLIC BLOOD PRESSURE: 79 MMHG | DIASTOLIC BLOOD PRESSURE: 681 MMHG | OXYGEN SATURATION: 93 % | OXYGEN SATURATION: 91 % | DIASTOLIC BLOOD PRESSURE: 681 MMHG | HEIGHT: 61 IN | SYSTOLIC BLOOD PRESSURE: 132 MMHG | OXYGEN SATURATION: 94 %

## 2024-01-11 DIAGNOSIS — K22.719 BARRETT'S ESOPHAGUS WITH DYSPLASIA, UNSPECIFIED: ICD-10-CM

## 2024-01-11 DIAGNOSIS — K31.7 POLYP OF STOMACH AND DUODENUM: ICD-10-CM

## 2024-01-11 DIAGNOSIS — K92.2 GASTROINTESTINAL HEMORRHAGE, UNSPECIFIED: ICD-10-CM

## 2024-01-11 DIAGNOSIS — K21.00 GASTRO-ESOPHAGEAL REFLUX DISEASE WITH ESOPHAGITIS, WITHOUT B: ICD-10-CM

## 2024-01-11 DIAGNOSIS — K21.9 GASTRO-ESOPHAGEAL REFLUX DISEASE WITHOUT ESOPHAGITIS: ICD-10-CM

## 2024-01-11 PROCEDURE — 43239 EGD BIOPSY SINGLE/MULTIPLE: CPT | Performed by: INTERNAL MEDICINE

## 2024-01-11 PROCEDURE — 88313 SPECIAL STAINS GROUP 2: CPT | Performed by: STUDENT IN AN ORGANIZED HEALTH CARE EDUCATION/TRAINING PROGRAM

## 2024-01-11 PROCEDURE — 88342 IMHCHEM/IMCYTCHM 1ST ANTB: CPT | Performed by: STUDENT IN AN ORGANIZED HEALTH CARE EDUCATION/TRAINING PROGRAM

## 2024-01-11 PROCEDURE — 88305 TISSUE EXAM BY PATHOLOGIST: CPT | Performed by: STUDENT IN AN ORGANIZED HEALTH CARE EDUCATION/TRAINING PROGRAM

## 2024-01-11 RX ORDER — SODIUM PICOSULFATE, MAGNESIUM OXIDE, AND ANHYDROUS CITRIC ACID 10; 3.5; 12 MG/160ML; G/160ML; G/160ML
LIQUID ORAL
Qty: 350 | Refills: 0 | Status: COMPLETED
Start: 2024-01-11 | End: 2024-03-05

## 2024-01-11 RX ADMIN — PROPOFOL 200 MG: 10 INJECTION, EMULSION INTRAVENOUS at 13:57

## 2024-01-12 LAB
GASTRO ONE PATHOLOGY: PDF REPORT: (no result)

## 2024-03-05 ENCOUNTER — AMBULATORY SURGICAL CENTER (OUTPATIENT)
Dept: URBAN - METROPOLITAN AREA SURGERY 3 | Facility: SURGERY | Age: 79
End: 2024-03-05
Payer: OTHER GOVERNMENT

## 2024-03-05 ENCOUNTER — OFFICE (OUTPATIENT)
Dept: URBAN - METROPOLITAN AREA PATHOLOGY 12 | Facility: PATHOLOGY | Age: 79
End: 2024-03-05
Payer: OTHER GOVERNMENT

## 2024-03-05 VITALS
TEMPERATURE: 98.2 F | OXYGEN SATURATION: 98 % | HEART RATE: 79 BPM | RESPIRATION RATE: 11 BRPM | HEIGHT: 61 IN | DIASTOLIC BLOOD PRESSURE: 94 MMHG | DIASTOLIC BLOOD PRESSURE: 94 MMHG | HEART RATE: 79 BPM | HEART RATE: 110 BPM | SYSTOLIC BLOOD PRESSURE: 119 MMHG | SYSTOLIC BLOOD PRESSURE: 119 MMHG | DIASTOLIC BLOOD PRESSURE: 92 MMHG | WEIGHT: 170 LBS | HEART RATE: 110 BPM | SYSTOLIC BLOOD PRESSURE: 142 MMHG | OXYGEN SATURATION: 98 % | HEART RATE: 80 BPM | HEIGHT: 61 IN | DIASTOLIC BLOOD PRESSURE: 88 MMHG | OXYGEN SATURATION: 97 % | HEART RATE: 82 BPM | HEART RATE: 110 BPM | SYSTOLIC BLOOD PRESSURE: 151 MMHG | RESPIRATION RATE: 18 BRPM | DIASTOLIC BLOOD PRESSURE: 85 MMHG | DIASTOLIC BLOOD PRESSURE: 89 MMHG | TEMPERATURE: 98.2 F | DIASTOLIC BLOOD PRESSURE: 88 MMHG | WEIGHT: 170 LBS | HEART RATE: 80 BPM | RESPIRATION RATE: 15 BRPM | DIASTOLIC BLOOD PRESSURE: 85 MMHG | RESPIRATION RATE: 16 BRPM | OXYGEN SATURATION: 97 % | RESPIRATION RATE: 15 BRPM | OXYGEN SATURATION: 98 % | DIASTOLIC BLOOD PRESSURE: 89 MMHG | HEIGHT: 61 IN | RESPIRATION RATE: 11 BRPM | TEMPERATURE: 98.2 F | RESPIRATION RATE: 18 BRPM | OXYGEN SATURATION: 95 % | RESPIRATION RATE: 16 BRPM | RESPIRATION RATE: 19 BRPM | WEIGHT: 170 LBS | RESPIRATION RATE: 19 BRPM | HEART RATE: 79 BPM | SYSTOLIC BLOOD PRESSURE: 108 MMHG | HEART RATE: 78 BPM | SYSTOLIC BLOOD PRESSURE: 142 MMHG | HEART RATE: 82 BPM | SYSTOLIC BLOOD PRESSURE: 119 MMHG | SYSTOLIC BLOOD PRESSURE: 153 MMHG | RESPIRATION RATE: 15 BRPM | DIASTOLIC BLOOD PRESSURE: 94 MMHG | SYSTOLIC BLOOD PRESSURE: 142 MMHG | SYSTOLIC BLOOD PRESSURE: 108 MMHG | SYSTOLIC BLOOD PRESSURE: 151 MMHG | DIASTOLIC BLOOD PRESSURE: 85 MMHG | HEART RATE: 78 BPM | DIASTOLIC BLOOD PRESSURE: 88 MMHG | OXYGEN SATURATION: 95 % | RESPIRATION RATE: 16 BRPM | OXYGEN SATURATION: 95 % | HEART RATE: 80 BPM | SYSTOLIC BLOOD PRESSURE: 153 MMHG | OXYGEN SATURATION: 97 % | HEART RATE: 78 BPM | DIASTOLIC BLOOD PRESSURE: 89 MMHG | SYSTOLIC BLOOD PRESSURE: 108 MMHG | RESPIRATION RATE: 19 BRPM | RESPIRATION RATE: 18 BRPM | RESPIRATION RATE: 11 BRPM | HEART RATE: 82 BPM | DIASTOLIC BLOOD PRESSURE: 92 MMHG | DIASTOLIC BLOOD PRESSURE: 92 MMHG | SYSTOLIC BLOOD PRESSURE: 151 MMHG | SYSTOLIC BLOOD PRESSURE: 153 MMHG

## 2024-03-05 DIAGNOSIS — K64.2 THIRD DEGREE HEMORRHOIDS: ICD-10-CM

## 2024-03-05 DIAGNOSIS — K57.30 DIVERTICULOSIS OF LARGE INTESTINE WITHOUT PERFORATION OR ABS: ICD-10-CM

## 2024-03-05 DIAGNOSIS — Z09 ENCOUNTER FOR FOLLOW-UP EXAMINATION AFTER COMPLETED TREATMEN: ICD-10-CM

## 2024-03-05 DIAGNOSIS — D12.2 BENIGN NEOPLASM OF ASCENDING COLON: ICD-10-CM

## 2024-03-05 DIAGNOSIS — Z86.010 PERSONAL HISTORY OF COLONIC POLYPS: ICD-10-CM

## 2024-03-05 DIAGNOSIS — D17.5 BENIGN LIPOMATOUS NEOPLASM OF INTRA-ABDOMINAL ORGANS: ICD-10-CM

## 2024-03-05 PROBLEM — K63.5 POLYP OF COLON: Status: ACTIVE | Noted: 2024-03-05

## 2024-03-05 PROCEDURE — 45385 COLONOSCOPY W/LESION REMOVAL: CPT | Mod: PT | Performed by: INTERNAL MEDICINE

## 2024-03-05 PROCEDURE — 88342 IMHCHEM/IMCYTCHM 1ST ANTB: CPT | Performed by: PATHOLOGY

## 2024-03-05 PROCEDURE — 88305 TISSUE EXAM BY PATHOLOGIST: CPT | Performed by: PATHOLOGY

## 2024-03-08 LAB
GASTRO ONE PATHOLOGY: PDF REPORT: (no result)

## 2024-09-12 PROBLEM — K64.9 UNSPECIFIED HEMORRHOIDS: Status: ACTIVE | Noted: 2018-08-28

## 2025-01-09 ENCOUNTER — OFFICE (OUTPATIENT)
Dept: URBAN - METROPOLITAN AREA CLINIC 11 | Facility: CLINIC | Age: 80
End: 2025-01-09
Payer: OTHER GOVERNMENT

## 2025-01-09 VITALS
OXYGEN SATURATION: 98 % | HEIGHT: 61 IN | WEIGHT: 170 LBS | SYSTOLIC BLOOD PRESSURE: 131 MMHG | HEART RATE: 86 BPM | DIASTOLIC BLOOD PRESSURE: 86 MMHG

## 2025-01-09 DIAGNOSIS — K22.70 BARRETT'S ESOPHAGUS WITHOUT DYSPLASIA: ICD-10-CM

## 2025-01-09 DIAGNOSIS — K21.9 GASTRO-ESOPHAGEAL REFLUX DISEASE WITHOUT ESOPHAGITIS: ICD-10-CM

## 2025-01-09 PROCEDURE — 99213 OFFICE O/P EST LOW 20 MIN: CPT | Performed by: NURSE PRACTITIONER

## 2025-01-09 RX ORDER — FAMOTIDINE 10 MG/1
10 TABLET, FILM COATED ORAL
Qty: 90 | Refills: 3 | Status: ACTIVE

## 2025-01-09 RX ORDER — RABEPRAZOLE SODIUM 20 MG/1
TABLET, DELAYED RELEASE ORAL
Qty: 180 | Refills: 1 | Status: ACTIVE